# Patient Record
(demographics unavailable — no encounter records)

---

## 2025-01-10 NOTE — HISTORY OF PRESENT ILLNESS
[6] : 6 [1] : 2 [Full time] : Work status: full time [de-identified] : 1/10/2025: Pt here to f/u B shoulder pain. CSI b/l shoulders at last visit with relief. Reports that he has been experiencing a return and worsening of his b/l shoulder pain R>L, denies any new GERALD. Denies c-spine pain or n/t in fingers. Pain associated with stiffness, especially at night.   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 AVTAR AGUIRRE, a 46 year old male, presents today for  b/l shoulder pain. No known injury/fall. Experiencing tight pain and limited ROM of LEOPOLDO shoulders for about 6 weeks. States hx of LT shoulder labral repair 3/2020 with Dr. Light. CSI of RT shoulder in 2023 with good relief. No recent treatment. Pain bothersome when laying down at night. Pain most present after activity. Denies n/t or pain radiating throughout the UE. [] : no [FreeTextEntry1] : LEOPOLDO shoulders [FreeTextEntry5] : 46-year-old male presenting with LEOPOLDO shoulder pain. No known injury/fall. Experiencing tight pain and limited ROM of LEOPOLDO shoulders for about 6 weeks. States hx of LT shoulder labral repair and arthroplasty in 3/2020 with Dr. Light. CSI of RT shoulder in 2023 with good relief. No recent treatment. Worsening pain at nighttime.  [de-identified] : 3/2020- LT shoulder  [de-identified] :

## 2025-01-10 NOTE — PROCEDURE
[FreeTextEntry3] : Large joint injection was performed of the b/l shoulders . An injection of Lidocaine 3cc of 1% , Bupivacaine (Marcaine) 6cc of 0.5% , Triamcinolone (Kenalog) 2cc of 40 mg  was used. Patient was advised to call if redness, pain or fever occur and apply ice for 15 minutes out of every hour for the next 12-24 hours as tolerated.   Patient has tried OTC's including aspirin, Ibuprofen, Aleve, etc or prescription NSAIDS, and/or exercises at home and/or physical therapy without satisfactory response, patient had decreased mobility in the joint and the risks benefits, and alternatives have been discussed, and verbal consent was obtained.  The site was prepped with alcohol, betadine and ethyl chloride sprayed topically  The risks, benefits and contents of the injection have been discussed.  Risks include but are not limited to allergic reaction, flare reaction, permanent white skin discoloration at the injection site and infection.  The patient understands the risks and agrees to having the injection.  All questions have been answered.  Ultrasound guidance was indicated for this patient due to prior failure or difficult injection. All ultrasound images have been permanently captured and stored accordingly in our picture archiving and communication system.

## 2025-01-10 NOTE — DISCUSSION/SUMMARY
[de-identified] : 45yo M with b/l acute on chronic exacerbation of b/l shoulder OA and adhesive capsulitis. Pain complaints today are R>L. r/b/a of surgical intervention and conservative management discussed. pt given to opportunity to ask all questions and all questions were answered. Discussed possible future need for shoulder replacement.  With good ROM and strength on exam today, would recommend conservative treatments at this time.   1) csi b/l shoulders today - tolerated well  2) discussed availability of visco injections and pt would like to proceed. will request auth for a series of injections.   3) c/w home exercises, physical therapy 4) cryotherapy, rest and activity modification  5) discussed timing of repeat csi  6) rtc with auth for injections.   Entered by Lucero Carey acting as scribe. Dr. Light- The documentation recorded by the scribe accurately reflects the service I personally performed and the decisions made by me.

## 2025-01-10 NOTE — PHYSICAL EXAM
[Right] : right shoulder [Left] : left shoulder [NL (0-180)] : full active abduction 0-180 degrees [] : no erythema [de-identified] : external rotation at 90 degrees of abduction 80 degrees [TWNoteComboBox5] : internal rotation at 90 degrees of abduction 30 degrees

## 2025-06-27 NOTE — PHYSICAL EXAM
[Right] : right shoulder [Left] : left shoulder [] : no erythema [TWNoteComboBox6] : internal rotation L1 [TWNoteComboBox7] : active forward flexion 170 degrees [de-identified] : active abduction 170 degrees [de-identified] : external rotation at 90 degrees of abduction 80 degrees [TWNoteComboBox5] : internal rotation at 90 degrees of abduction 30 degrees

## 2025-06-27 NOTE — IMAGING
[Bilateral] : shoulder bilaterally [FreeTextEntry1] : 4 views (AP, ER, Y view and Axillary) obtained and interpreted on 06/27/2025. left shoulder large inferior humeral head spur. degenerative changes b/l shoulders.

## 2025-06-27 NOTE — DISCUSSION/SUMMARY
[de-identified] : 48yo M with b/l acute on chronic exacerbation of b/l shoulder djd. Pain complaints today are R>L. r/b/a of surgical intervention and conservative management discussed. pt given to opportunity to ask all questions and all questions were answered. Discussed possible future need for shoulder replacement.   1) CSI b/l shoulders today - tolerated well 2) discussed availability of visco injections, will setup through appraio program when ready. 3) cryotherapy, rest and activity modification 4) MRI right shoulder, eval rotator cuff 5) rtc after MRI    Entered by Lucero Carey acting as scribe. Dr. Light- The documentation recorded by the scribe accurately reflects the service I personally performed and the decisions made by me.

## 2025-06-27 NOTE — HISTORY OF PRESENT ILLNESS
[6] : 6 [1] : 2 [Full time] : Work status: full time [de-identified] : 06/27/25: Pt here to f/u for B/L shoulder pain. pain complaints are R>L CSI last visit, 01/10/25, with relief. Pt states pain returned about 2 weeks ago. Pt describes pain as dull and achy pain when sleeping, OH movements, and abduction. Pt c/o limited ROM. Pt states R shoulder pain is worse than L shoulder pain.   1/10/2025: Pt here to f/u B shoulder pain. CSI b/l shoulders at last visit with relief. Reports that he has been experiencing a return and worsening of his b/l shoulder pain R>L, denies any new GERALD. Denies c-spine pain or n/t in fingers. Pain associated with stiffness, especially at night.   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 AVTAR AGUIRRE, a 46 year old male, presents today for  b/l shoulder pain. No known injury/fall. Experiencing tight pain and limited ROM of LEOPOLDO shoulders for about 6 weeks. States hx of LT shoulder labral repair 3/2020 with Dr. Light. CSI of RT shoulder in 2023 with good relief. No recent treatment. Pain bothersome when laying down at night. Pain most present after activity. Denies n/t or pain radiating throughout the UE. [] : no [FreeTextEntry1] : LEOPOLDO shoulders [FreeTextEntry5] : 46-year-old male presenting with LEOPOLDO shoulder pain. No known injury/fall. Experiencing tight pain and limited ROM of LEOPOLDO shoulders for about 6 weeks. States hx of LT shoulder labral repair and arthroplasty in 3/2020 with Dr. Light. CSI of RT shoulder in 2023 with good relief. No recent treatment. Worsening pain at nighttime.  [de-identified] : 3/2020- LT shoulder  [de-identified] :

## 2025-06-27 NOTE — PROCEDURE
[FreeTextEntry3] : Large joint injection was performed of the b/l shoulders . An injection of Lidocaine 3cc of 1% , Bupivacaine (Marcaine) 6cc of 0.5% , Triamcinolone (Kenalog) 2cc of 40 mg  was used. Patient was advised to call if redness, pain or fever occur and apply ice for 15 minutes out of every hour for the next 12-24 hours as tolerated.   Patient has tried OTC's including aspirin, Ibuprofen, Aleve, etc or prescription NSAIDS, and/or exercises at home and/or physical therapy without satisfactory response, patient had decreased mobility in the joint and the risks benefits, and alternatives have been discussed, and verbal consent was obtained. The site was prepped with alcohol, betadine and ethyl chloride sprayed topically   The risks, benefits and contents of the injection have been discussed.  Risks include but are not limited to allergic reaction, flare reaction, permanent white skin discoloration at the injection site and infection.  The patient understands the risks and agrees to having the injection.  All questions have been answered.   Ultrasonic guidance separate from the diagnostic ultrasound would be necessary to ensure proper placement of the needle and medication into the correct space/structures. Inadvertent injection into the substance of the rotator cuff tendon has been shown in several studies to result in potential damage to the tendon. Multiple studies have confirmed the improved accuracy of placing the needle correctly into the subacromial space when ultrasound guidance is used. All ultrasound images have been permanently captured and stored accordingly in our picture archiving and communication system.